# Patient Record
Sex: MALE | Race: OTHER | HISPANIC OR LATINO | URBAN - METROPOLITAN AREA
[De-identification: names, ages, dates, MRNs, and addresses within clinical notes are randomized per-mention and may not be internally consistent; named-entity substitution may affect disease eponyms.]

---

## 2017-02-21 ENCOUNTER — EMERGENCY (EMERGENCY)
Facility: HOSPITAL | Age: 32
LOS: 1 days | Discharge: HOME | End: 2017-02-21
Attending: EMERGENCY MEDICINE
Payer: MEDICAID

## 2017-02-21 VITALS
HEART RATE: 68 BPM | TEMPERATURE: 98 F | WEIGHT: 160.06 LBS | HEIGHT: 65 IN | OXYGEN SATURATION: 97 % | RESPIRATION RATE: 17 BRPM | SYSTOLIC BLOOD PRESSURE: 121 MMHG | DIASTOLIC BLOOD PRESSURE: 79 MMHG

## 2017-02-21 DIAGNOSIS — J40 BRONCHITIS, NOT SPECIFIED AS ACUTE OR CHRONIC: ICD-10-CM

## 2017-02-21 DIAGNOSIS — R04.2 HEMOPTYSIS: ICD-10-CM

## 2017-02-21 PROCEDURE — 99283 EMERGENCY DEPT VISIT LOW MDM: CPT | Mod: 25

## 2017-02-21 PROCEDURE — 71010: CPT | Mod: 26

## 2017-02-21 PROCEDURE — T1013: CPT

## 2017-02-21 PROCEDURE — 71045 X-RAY EXAM CHEST 1 VIEW: CPT

## 2017-02-21 PROCEDURE — 99283 EMERGENCY DEPT VISIT LOW MDM: CPT

## 2017-02-21 NOTE — ED ADULT NURSE NOTE - CHIEF COMPLAINT QUOTE
coughing up blood x 5 months weight loss hx of tb sent from [facility] [place] for eval of reactivation of tb

## 2017-02-21 NOTE — ED PROVIDER NOTE - NS ED MD SCRIBE ATTENDING SCRIBE SECTIONS
HIV/PAST MEDICAL/SURGICAL/SOCIAL HISTORY/PHYSICAL EXAM/VITAL SIGNS( Pullset)/REVIEW OF SYSTEMS/HISTORY OF PRESENT ILLNESS

## 2017-02-21 NOTE — ED PROVIDER NOTE - OBJECTIVE STATEMENT
32 y/o male presents w/ Denies HA, dizziness, numbness, tingling, photophobia, diplopia, change in vision/hearing/gait/mental status/speech, focal weakness, neck pain, rash, fever, chills, stiffness, abdominal pain, hx of DVT/PE, leg swelling, CP, SOB, palpitations, diaphoresis, N/V/D/C, abdominal pain, change in urinary/bowel function, dysuria, hematuria, flank pain, malaise, or motor/sensory deficits. 32 y/o male presents w/ intermittent hemoptysis for past month. Pt has had approximately 3-4 episodes over that time. H/o TB 4 years ago in his home country of [facility], treated with 9 month abx course. Pt has been in the [place] for 2 [place] and denies any recent travel. Denies smoking history, HA, dizziness, numbness, tingling, photophobia, diplopia, change in vision/hearing/gait/mental status/speech, focal weakness, neck pain, rash, fever, chills, stiffness, abdominal pain, hx of DVT/PE, leg swelling, CP, SOB, palpitations, diaphoresis, N/V/D/C, abdominal pain, change in urinary/bowel function, dysuria, hematuria, flank pain, malaise, or motor/sensory deficits.  utilized to obtain history.

## 2017-02-21 NOTE — ED ADULT TRIAGE NOTE - CHIEF COMPLAINT QUOTE
coughing up blood x 5 months weight loss hx of tb sent from [facility] for eval of reactivation of tb

## 2017-02-21 NOTE — ED ADULT NURSE NOTE - OBJECTIVE STATEMENT
32 y/o m comes to ED for TB evaluation. as per pt. he has been coughing up flem with blood for 1-2 months. pt. denies nausea, fever, diarrhea, chills. pt. denies any recent travel. pt. denies pain or discomfort. will continue to monitor. pending dispo.

## 2017-02-21 NOTE — ED PROVIDER NOTE - MEDICAL DECISION MAKING DETAILS
pt has no active TB symptoms spke with dr poon ID agrees with plan of care hemoptysis likely secondary to bronchitis return to ed for fever intractable chest pain or sob. f/u [facility] this week without fail

## 2017-02-21 NOTE — ED PROVIDER NOTE - NEUROLOGICAL, MLM
neuro: CN II - XII intact, EOMI, PERRL, no papilledema, 5/5 muscle strength x 4 extremities, no sensory deficits, 2+ dtr globally, negative babinski, no ataxic gait, normal PERRY and FNT, normal romberg

## 2020-06-23 NOTE — ED ADULT NURSE NOTE - GASTROINTESTINAL WDL
Abdomen soft, nontender, nondistended, bowel sounds present in all 4 quadrants. Authored by Resident/PA/NP

## 2023-02-19 NOTE — ED PROVIDER NOTE - CARDIAC, MLM
[facility] PSYCHIATRY  DISCHARGE SUMMARY     DISCHARGE DATA     Eli Monroe Jr MRN# [id number]   Age: 41 year old YOB: 1981     Date of Admission: 2/5/2023  Date of Discharge: 2/20/2023  Discharge Provider: Westley Poole DO       REASON FOR ADMISSION     This is a 41 year old male with a PMH of bipolar disorder and anxiety who presens with concern for héctor and decompensation after recently being hospitalized for psychosis and depression, being stabilized and discharged on 1/31. The patient was recently put back on his antipsychotic after being off for a long period. The patient stabilized fairly quickly. The patient stopped taking his medications leading to relapse.       DISCHARGE DIAGNOSES     1. Bipolar I disorder, current episode depressed with mixed and psychotic features  2. Generalized anxiety disorder  3. Stimulant (meth) use disorder, in sustained remission  4. Opioid use disorder, mild in severity  5. Tardive dyskinesia       CONSULTS     1. OT       HOSPITAL COURSE   Psychiatric Course:    Legal status: Orders Placed This Encounter      Voluntary    Patient was admitted to unit 5 due to the aforementioned presentation. The patient was placed under 15 minute checks to ensure patient safety. The patient participated in unit programming and groups as able.    Mr. Fer Parsons did not require seclusion/restraint during hospitalization.     We reviewed with Mr. Fer Parsons current and past medication trials including duration, dose, response and side effects. During this hospitalization, the following changes to the patient's psychotropic medications were made:    PTA psychotropic medications held:      -None     PTA psychotropic medications continued/changed:      -Latuda 40 mg with dinner increased to 60 mg   -Ativan 1 mg BID prn anxiety scheduled for brief course then returned to prn   -Propranolol 10 mg BID     New psychotropic medications initiated:      -Depakote ER 1,500 mg at  bedtime as of 2/16    The patient's Latuda was increased to further address his mood episode and psychosis. Depakote was started given better evidence with dual treatment for his current mood episode. He had some nausea from this medication limiting dosing at times, but this improved and he was able to tolerate. He noted some improvement in his depression and psychosis. The patient's SI resolved also.     The patient did have remaining amotivation, which can be seen as negative symptoms as a part of his bipolar disorder. This also can be a symptom of depression. OT testing was performed to determine his ability to care for himself with him having that ability, but struggling with motivation. Discussed the patient's state with his family with them feeling comfortable with him returning home with the additional supports lined up to further assist as described below.    With these changes and supports the patient noticed improvement in their symptoms and felt sufficiently ready for discharge. As a result, Eli Monroe Jr was discharged. At the time of discharge, Eli Monroe Jr was determined to not be a danger to self or others. At the current time of discharge, the patient does not meet criteria for involuntary hospitalization. On the day of discharge, the patient reports that they do not have suicidal or homicidal ideation. Steps taken to minimize risk include: assessing patient s behavior and thought process daily during hospital stay, discharging patient with adequate plan for follow up for mental and physical health and discussing safety plan of returning to the hospital should the patient ever have thoughts of harming themselves or others. Therefore, based on all available evidence including the factors cited above, the patient does not appear to be at imminent risk for self-harm, and is appropriate for outpatient level of care. However, if patient uses substances or is medication non-adherent, their  "risk of decompensation, psychosis, and SI will be elevated. This was discussed with the patient.    Medical Course:    The patient was medically cleared for admission to inpatient psychiatry. The following medical issues arose below. The patient was medically stable at the time of discharge.     #. Prediabetes  - Re-started Metformin for prevention and then also weight loss on neuroleptic. Increased to 500 mg BID.  - F/U with PCP on outpatient basis     #. Elevated BP, improved  - Clonidine prn, stopped at discharge  - F/U with PCP for monitoring       DISCHARGE MEDICATIONS     Current Discharge Medication List      START taking these medications    Details   divalproex sodium extended-release (DEPAKOTE ER) 500 MG 24 hr tablet Take 3 tablets (1,500 mg) by mouth At Bedtime  Qty: 90 tablet, Refills: 1    Associated Diagnoses: Bipolar I disorder (H)      metFORMIN (GLUCOPHAGE) 500 MG tablet Take 1 tablet (500 mg) by mouth 2 times daily (with meals)  Qty: 60 tablet, Refills: 1    Associated Diagnoses: Bipolar I disorder (H)         CONTINUE these medications which have CHANGED    Details   LORazepam (ATIVAN) 1 MG tablet Take 1 tablet (1 mg) by mouth 2 times daily as needed for anxiety  Qty: 45 tablet, Refills: 1    Associated Diagnoses: Bipolar I disorder (H)      lurasidone (LATUDA) 60 MG TABS tablet Take 1 tablet (60 mg) by mouth daily (with dinner)  Qty: 30 tablet, Refills: 1    Associated Diagnoses: Bipolar I disorder (H)      propranolol (INDERAL) 10 MG tablet Take 1 tablet (10 mg) by mouth 2 times daily  Qty: 60 tablet, Refills: 1    Associated Diagnoses: Psychosis, unspecified psychosis type (H)         STOP taking these medications       ibuprofen (ADVIL/MOTRIN) 200 MG capsule Comments:   Reason for Stopping:                MENTAL STATUS EXAM   Vitals: /81   Pulse 57   Temp 97.7  F (36.5  C) (Temporal)   Resp 14   Ht 1.626 m (5' 4\")   Wt 91.6 kg (202 lb)   SpO2 95%   BMI 34.67 kg/m      Appearance: " "Alert, oriented, dressed in hospital scrubs  Attitude: More cooperative  Eye Contact: Fair  Mood: \"Better\"  Affect: Blunted, more range  Speech: Regular rate and rhythm  Psychomotor Behavior: No tremor, rigidity, akathisia, or psychomotor retardation. Oral dyskinesia.   Thought Process: Logical, goal directed   Associations: No loose associations   Thought Content: Denies SI. No SIB. Denies AVH. No evidence of paranoid beliefs. Poverty of content.  Insight: Adequate  Judgment: Adequate  Oriented to: Person, place, and time  Attention Span and Concentration: Intact  Recent and Remote Memory: Intact  Language: English with appropriate syntax and vocabulary  Fund of Knowledge: Average  Muscle Strength and Tone: Grossly normal  Gait and Station: Grossly normal        DISCHARGE PLAN     1.  Education given regarding diagnostic and treatment options with risks, benefits and alternatives with adequate verbalization of understanding.  2.  Discharge to home with the [facility] through [place] set-up. Upon detailed review of risk factors, patient amenable for release.   3.  Continue aforementioned medications and associated medication changes with follow-up by outpatient provider.  4.  Crisis management planning in place.    5.  Nursing and  to review further discharge recommendations.   6.  Patient is being discharged with the following appointments:    Health Care Follow-up:     [facility]  Appointment: 2/21/2023 @ 11am for intake and DA to schedule medication management appointment. (In  [place] office).  [facility]  [place] [address]  Phone: [phone]  Fax: [phone]     [facility]  [place] Office  [address] [place] [address]   Phone: [phone]    7. General discharge instructions:       Reason for your hospital stay    Elevated mood episode.     Follow-up and recommended labs and tests    See  Recommendations for " outpatient follow-up appointments. Routine depakote labs.     Activity    Your activity upon discharge: activity as tolerated.     Discharge Instructions    Please continue to take your medications as prescribed. If you do not take your medications, you will likely quickly relapse and have to return to the hospital. Please also practice healthy lifestyle choices, including exercise, healthy diet, stress management, adequate sleep, and cultivating supportive relationships. Please also avoid use of any substances as best as able. Please return to the ED if your symptoms worsen or you do not feel safe.     Diet    Follow this diet upon discharge: Orders Placed This Encounter      Regular Diet Adult           DISCHARGE SERVICES PROVIDED     45 minutes spent on discharge services, including:  Final examination of patient.  Review and discussion of hospital stay.  Instructions for continued outpatient care/goals.  Preparation of discharge records.  Preparation of medications refills and new prescriptions.  Preparation of applicable referral forms.        ATTESTATION     Dr. Westley Poole  Psychiatrist     VIDEO VISIT    Patient has given verbal consent for video visit?: Yes     Video- Visit Details  Type of service:  video visit for mental health treatment.  Time of service:  Date:  02/19/2023  Video Start Time: 900 AM  Video End Time: 930 AM    Reason for video visit: COVID-19 [place]  Originating Site (patient location):  [facility]  Distant Site (provider location):  Remote location  Mode of Communication:  Video Conference via [employer]       LABS THIS ADMISSION     Results for orders placed or performed during the hospital encounter of 02/05/23   Basic metabolic panel     Status: Abnormal   Result Value Ref Range    Sodium 136 136 - 145 mmol/L    Potassium 4.1 3.4 - 5.3 mmol/L    Chloride 98 98 - 107 mmol/L    Carbon Dioxide (CO2) 23 22 - 29 mmol/L    Anion Gap 15 7 - 15 mmol/L    Urea  Nitrogen 7.8 6.0 - 20.0 mg/dL    Creatinine 0.84 0.67 - 1.17 mg/dL    Calcium 10.5 (H) 8.6 - 10.0 mg/dL    Glucose 135 (H) 70 - 99 mg/dL    GFR Estimate >90 >60 mL/min/1.73m2   Asymptomatic COVID-19 Virus (Coronavirus) by PCR Nasopharyngeal     Status: Normal    Specimen: Nasopharyngeal; Swab   Result Value Ref Range    SARS CoV2 PCR Negative Negative    Narrative    Testing was performed using the ADTELLIGENCEert Xpress SARS-CoV-2 Assay on the Cepheid Gene-Xpert Instrument Systems. Additional information about this Emergency Use Authorization (EUA) assay can be found via the Lab Guide. This test should be ordered for the detection of SARS-CoV-2 in individuals who meet SARS-CoV-2 clinical and/or epidemiological criteria as well as from individuals without symptoms or other reasons to suspect COVID-19. Test performance for asymptomatic patients has only been established in anterior nasal swab specimens. This test is for in vitro diagnostic use under the FDA EUA for laboratories certified under CLIA to perform high complexity testing. This test has not been FDA cleared or approved. A negative result does not rule out the presence of PCR inhibitors in the specimen or target RNA concentration below the limit of detection for the assay. The possibility of a false negative should be considered if the patient's recent exposure or clinical presentation suggests COVID-19. This test was validated by [facility] laboratory. This laboratory is certified under the Clinical Laboratory Improvement Amendments (CLIA) as qualified to perform high complexity testing.   CBC with platelets and differential     Status: Abnormal   Result Value Ref Range    WBC Count 14.6 (H) 4.0 - 11.0 10e3/uL    RBC Count 5.44 4.40 - 5.90 10e6/uL    Hemoglobin 17.1 13.3 - 17.7 g/dL    Hematocrit 48.4 40.0 - 53.0 %    MCV 89 78 - 100 fL    MCH 31.4 26.5 - 33.0 pg    MCHC 35.3 31.5 - 36.5 g/dL    RDW 13.1 10.0 - 15.0 %    Platelet Count 386 150 -  450 10e3/uL    % Neutrophils 61 %    % Lymphocytes 29 %    % Monocytes 9 %    % Eosinophils 0 %    % Basophils 0 %    % Immature Granulocytes 1 %    NRBCs per 100 WBC 0 <1 /100    Absolute Neutrophils 9.0 (H) 1.6 - 8.3 10e3/uL    Absolute Lymphocytes 4.2 0.8 - 5.3 10e3/uL    Absolute Monocytes 1.3 0.0 - 1.3 10e3/uL    Absolute Eosinophils 0.0 0.0 - 0.7 10e3/uL    Absolute Basophils 0.1 0.0 - 0.2 10e3/uL    Absolute Immature Granulocytes 0.1 <=0.4 10e3/uL    Absolute NRBCs 0.0 10e3/uL   Asymptomatic COVID-19 Virus (Coronavirus) by PCR Nasopharyngeal     Status: Normal    Specimen: Nasopharyngeal; Swab   Result Value Ref Range    SARS CoV2 PCR Negative Negative    Narrative    Testing was performed using the Xpert Xpress SARS-CoV-2 Assay on the Cepheid Gene-Xpert Instrument Systems. Additional information about this Emergency Use Authorization (EUA) assay can be found via the Lab Guide. This test should be ordered for the detection of SARS-CoV-2 in individuals who meet SARS-CoV-2 clinical and/or epidemiological criteria as well as from individuals without symptoms or other reasons to suspect COVID-19. Test performance for asymptomatic patients has only been established in anterior nasal swab specimens. This test is for in vitro diagnostic use under the FDA EUA for laboratories certified under CLIA to perform high complexity testing. This test has not been FDA cleared or approved. A negative result does not rule out the presence of PCR inhibitors in the specimen or target RNA concentration below the limit of detection for the assay. The possibility of a false negative should be considered if the patient's recent exposure or clinical presentation suggests COVID-19. This test was validated by [facility] laboratory. This laboratory is certified under the Clinical Laboratory Improvement Amendments (CLIA) as qualified to perform high complexity testing.   Hepatic panel     Status: Normal   Result Value Ref  Range    Protein Total 8.2 6.4 - 8.3 g/dL    Albumin 4.7 3.5 - 5.2 g/dL    Bilirubin Total 0.5 <=1.2 mg/dL    Alkaline Phosphatase 96 40 - 129 U/L    AST 22 10 - 50 U/L    ALT 40 10 - 50 U/L    Bilirubin Direct <0.20 0.00 - 0.30 mg/dL   Asymptomatic COVID-19 Virus (Coronavirus) by PCR Nasopharyngeal     Status: Normal    Specimen: Nasopharyngeal; Swab   Result Value Ref Range    SARS CoV2 PCR Negative Negative    Narrative    Testing was performed using the Xpert Xpress SARS-CoV-2 Assay on the Cepheid Gene-Xpert Instrument Systems. Additional information about this Emergency Use Authorization (EUA) assay can be found via the Lab Guide. This test should be ordered for the detection of SARS-CoV-2 in individuals who meet SARS-CoV-2 clinical and/or epidemiological criteria as well as from individuals without symptoms or other reasons to suspect COVID-19. Test performance for asymptomatic patients has only been established in anterior nasal swab specimens. This test is for in vitro diagnostic use under the FDA EUA for laboratories certified under CLIA to perform high complexity testing. This test has not been FDA cleared or approved. A negative result does not rule out the presence of PCR inhibitors in the specimen or target RNA concentration below the limit of detection for the assay. The possibility of a false negative should be considered if the patient's recent exposure or clinical presentation suggests COVID-19. This test was validated by [facility] laboratory. This laboratory is certified under the Clinical Laboratory Improvement Amendments (CLIA) as qualified to perform high complexity testing.   Asymptomatic COVID-19 Virus (Coronavirus) by PCR Nasopharyngeal     Status: Normal    Specimen: Nasopharyngeal; Swab   Result Value Ref Range    SARS CoV2 PCR Negative Negative    Narrative    Testing was performed using the Xpert Xpress SARS-CoV-2 Assay on the Cepheid Gene-Xpert Instrument Systems.  Additional information about this Emergency Use Authorization (EUA) assay can be found via the Lab Guide. This test should be ordered for the detection of SARS-CoV-2 in individuals who meet SARS-CoV-2 clinical and/or epidemiological criteria as well as from individuals without symptoms or other reasons to suspect COVID-19. Test performance for asymptomatic patients has only been established in anterior nasal swab specimens. This test is for in vitro diagnostic use under the FDA EUA for laboratories certified under CLIA to perform high complexity testing. This test has not been FDA cleared or approved. A negative result does not rule out the presence of PCR inhibitors in the specimen or target RNA concentration below the limit of detection for the assay. The possibility of a false negative should be considered if the patient's recent exposure or clinical presentation suggests COVID-19. This test was validated by [facility]. This laboratory is certified under the Clinical Laboratory Improvement Amendments (CLIA) as qualified to perform high complexity testing.   Asymptomatic COVID-19 Virus (Coronavirus) by PCR Nasopharyngeal     Status: Normal    Specimen: Nasopharyngeal; Swab   Result Value Ref Range    SARS CoV2 PCR Negative Negative    Narrative    Testing was performed using the Xpert Xpress SARS-CoV-2 Assay on the Cepheid Gene-Xpert Instrument Systems. Additional information about this Emergency Use Authorization (EUA) assay can be found via the Lab Guide. This test should be ordered for the detection of SARS-CoV-2 in individuals who meet SARS-CoV-2 clinical and/or epidemiological criteria as well as from individuals without symptoms or other reasons to suspect COVID-19. Test performance for asymptomatic patients has only been established in anterior nasal swab specimens. This test is for in vitro diagnostic use under the FDA EUA for laboratories certified under CLIA to perform high  complexity testing. This test has not been FDA cleared or approved. A negative result does not rule out the presence of PCR inhibitors in the specimen or target RNA concentration below the limit of detection for the assay. The possibility of a false negative should be considered if the patient's recent exposure or clinical presentation suggests COVID-19. This test was validated by [facility] laboratory. This laboratory is certified under the Clinical Laboratory Improvement Amendments (CLIA) as qualified to perform high complexity testing.   Comprehensive metabolic panel     Status: Normal   Result Value Ref Range    Sodium 137 136 - 145 mmol/L    Potassium 3.9 3.4 - 5.3 mmol/L    Chloride 101 98 - 107 mmol/L    Carbon Dioxide (CO2) 24 22 - 29 mmol/L    Anion Gap 12 7 - 15 mmol/L    Urea Nitrogen 13.7 6.0 - 20.0 mg/dL    Creatinine 1.06 0.67 - 1.17 mg/dL    Calcium 9.2 8.6 - 10.0 mg/dL    Glucose 94 70 - 99 mg/dL    Alkaline Phosphatase 74 40 - 129 U/L    AST 16 10 - 50 U/L    ALT 31 10 - 50 U/L    Protein Total 6.7 6.4 - 8.3 g/dL    Albumin 3.9 3.5 - 5.2 g/dL    Bilirubin Total 0.2 <=1.2 mg/dL    GFR Estimate >90 >60 mL/min/1.73m2   Valproic acid     Status: Abnormal   Result Value Ref Range    Valproic acid 29.9 (L)   ug/mL   CBC with platelets differential     Status: Abnormal    Narrative    The following orders were created for panel order CBC with platelets differential.  Procedure                               Abnormality         Status                     ---------                               -----------         ------                     CBC with platelets and d...[[id number]]  Abnormal            Final result                 Please view results for these tests on the individual orders.                           Normal rate, regular rhythm.  Heart sounds S1, S2.  No murmurs, rubs or gallops.